# Patient Record
Sex: FEMALE | Race: WHITE | NOT HISPANIC OR LATINO | Employment: OTHER | URBAN - METROPOLITAN AREA
[De-identification: names, ages, dates, MRNs, and addresses within clinical notes are randomized per-mention and may not be internally consistent; named-entity substitution may affect disease eponyms.]

---

## 2017-05-03 RX ORDER — ESCITALOPRAM OXALATE 10 MG/1
TABLET ORAL
Qty: 7 TABLET | OUTPATIENT
Start: 2017-05-03

## 2017-05-03 RX ORDER — QUETIAPINE FUMARATE 50 MG/1
TABLET, FILM COATED ORAL
Qty: 7 TABLET | OUTPATIENT
Start: 2017-05-03

## 2017-05-03 RX ORDER — DONEPEZIL HYDROCHLORIDE 10 MG/1
TABLET, FILM COATED ORAL
Qty: 7 TABLET | OUTPATIENT
Start: 2017-05-03

## 2017-05-03 NOTE — TELEPHONE ENCOUNTER
I have seen her once in January of 2015.  Last time we requested an office visit they were going to see about hospice taking over her care.

## 2017-05-03 NOTE — TELEPHONE ENCOUNTER
Son & daughter in law/ Kris/[place]   They stated she is being taken care of by [facility]. She has not needed to go to the doctor - she has been well.

## 2018-04-06 ENCOUNTER — HOSPITAL ENCOUNTER (EMERGENCY)
Facility: HOSPITAL | Age: 83
Discharge: HOME | End: 2018-04-06
Attending: EMERGENCY MEDICINE
Payer: MEDICARE

## 2018-04-06 VITALS
DIASTOLIC BLOOD PRESSURE: 89 MMHG | BODY MASS INDEX: 22.63 KG/M2 | SYSTOLIC BLOOD PRESSURE: 167 MMHG | HEIGHT: 62 IN | TEMPERATURE: 98 F | HEART RATE: 95 BPM | WEIGHT: 123 LBS | RESPIRATION RATE: 16 BRPM | OXYGEN SATURATION: 96 %

## 2018-04-06 DIAGNOSIS — S43.401A SPRAIN OF RIGHT SHOULDER, UNSPECIFIED SHOULDER SPRAIN TYPE, INITIAL ENCOUNTER: ICD-10-CM
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
## 2018-04-06 DIAGNOSIS — S70.00XA CONTUSION OF HIP, UNSPECIFIED LATERALITY, INITIAL ENCOUNTER: ICD-10-CM

## 2018-04-06 DIAGNOSIS — S00.83XA CONTUSION OF FOREHEAD, INITIAL ENCOUNTER: Primary | ICD-10-CM

## 2018-04-06 LAB
ALBUMIN SERPL BCP-MCNC: 3.4 G/DL
ALP SERPL-CCNC: 114 U/L
ALT SERPL W/O P-5'-P-CCNC: 10 U/L
ANION GAP SERPL CALC-SCNC: 11 MMOL/L
AST SERPL-CCNC: 22 U/L
BASOPHILS # BLD AUTO: 0 K/UL
BASOPHILS NFR BLD: 0.3 %
BILIRUB SERPL-MCNC: 0.7 MG/DL
BUN SERPL-MCNC: 11 MG/DL
CALCIUM SERPL-MCNC: 9.6 MG/DL
CHLORIDE SERPL-SCNC: 106 MMOL/L
CO2 SERPL-SCNC: 26 MMOL/L
CREAT SERPL-MCNC: 1.3 MG/DL
DIFFERENTIAL METHOD: ABNORMAL
EOSINOPHIL # BLD AUTO: 2.6 K/UL
EOSINOPHIL NFR BLD: 18.6 %
ERYTHROCYTE [DISTWIDTH] IN BLOOD BY AUTOMATED COUNT: 18.5 %
EST. GFR  (AFRICAN AMERICAN): 41 ML/MIN/1.73 M^2
EST. GFR  (NON AFRICAN AMERICAN): 35 ML/MIN/1.73 M^2
GLUCOSE SERPL-MCNC: 100 MG/DL
HCT VFR BLD AUTO: 40.2 %
HGB BLD-MCNC: 13 G/DL
INR PPP: 1.1
LYMPHOCYTES # BLD AUTO: 1.6 K/UL
LYMPHOCYTES NFR BLD: 11.2 %
MCH RBC QN AUTO: 27.1 PG
MCHC RBC AUTO-ENTMCNC: 32.3 G/DL
MCV RBC AUTO: 84 FL
MONOCYTES # BLD AUTO: 0.8 K/UL
MONOCYTES NFR BLD: 5.5 %
NEUTROPHILS # BLD AUTO: 8.9 K/UL
NEUTROPHILS NFR BLD: 64.4 %
PLATELET # BLD AUTO: 161 K/UL
PLATELET BLD QL SMEAR: ABNORMAL
PMV BLD AUTO: 11.1 FL
POTASSIUM SERPL-SCNC: 2.8 MMOL/L
PROT SERPL-MCNC: 7.9 G/DL
PROTHROMBIN TIME: 10.7 SEC
RBC # BLD AUTO: 4.79 M/UL
SODIUM SERPL-SCNC: 143 MMOL/L
TROPONIN I SERPL DL<=0.01 NG/ML-MCNC: 0.01 NG/ML
WBC # BLD AUTO: 13.9 K/UL
WBC NRBC COR # BLD: ABNORMAL K/UL

## 2018-04-06 PROCEDURE — 25000003 PHARM REV CODE 250: Performed by: EMERGENCY MEDICINE

## 2018-04-06 PROCEDURE — 85610 PROTHROMBIN TIME: CPT

## 2018-04-06 PROCEDURE — 85025 COMPLETE CBC W/AUTO DIFF WBC: CPT

## 2018-04-06 PROCEDURE — 93005 ELECTROCARDIOGRAM TRACING: CPT

## 2018-04-06 PROCEDURE — 99285 EMERGENCY DEPT VISIT HI MDM: CPT | Mod: 25

## 2018-04-06 PROCEDURE — 84484 ASSAY OF TROPONIN QUANT: CPT

## 2018-04-06 PROCEDURE — 36415 COLL VENOUS BLD VENIPUNCTURE: CPT

## 2018-04-06 PROCEDURE — 80053 COMPREHEN METABOLIC PANEL: CPT

## 2018-04-06 RX ORDER — HYDROCODONE BITARTRATE AND ACETAMINOPHEN 5; 325 MG/1; MG/1
1 TABLET ORAL
Status: COMPLETED | OUTPATIENT
Start: 2018-04-06 | End: 2018-04-06

## 2018-04-06 RX ADMIN — HYDROCODONE BITARTRATE AND ACETAMINOPHEN 1 TABLET: 5; 325 TABLET ORAL at 07:04

## 2018-04-06 NOTE — ED NOTES
Pt resting in bed awake and alert.  Wound to right arm cleaned and dressed.  Pt complains of right shoulder pain with any movement.

## 2018-04-06 NOTE — ED NOTES
"Pt resting in bed awake and alert.  Able to recount what happened this evening when she "stumbled and fell".  Right upper arm swollen and painful.  Admits to feeling somewhat better than earlier.  BP elevated at this time.  "

## 2018-04-06 NOTE — ED NOTES
Report called to [facility] and Ysabel verbalized understanding and stated the patient's daughter is on their way.

## 2018-04-06 NOTE — ED PROVIDER NOTES
Encounter Date: 2018       History     Chief Complaint   Patient presents with    Fall     pt resides at [facility]-fell but doesn'rt remember details; unknnown LOC; hematoma to right side of forehead and right humerus with skin tear to right lower arm     Chief complaint: Fall    History of present illness:Viviane Morales is a 89 y.o. female who presents via ambulance from [facility] after an unwitnessed fall.  She is found to have marked ecchymosis and swelling to her right forehead and shoulder.  She is unaware of the circumstances leading to her fall.  She denies any headache, neck pain chest or back pain.  She is unaware of any loss of consciousness.  She has a history of Alzheimer's dementia          Review of patient's allergies indicates:   Allergen Reactions    Penicillins      Other reaction(s): Rash    Propafenone      Other reaction(s): Muscle pain  Other reaction(s): Eye irritation    Toprol xl  [metoprolol succinate]      Other reaction(s): diarrhea    Verapamil      Other reaction(s): CONSTIPATION    Zyprexa [olanzapine]      oversedated     Past Medical History:   Diagnosis Date    *Atrial fibrillation     Arthritis     Blood transfusion     Cataract     Chronic kidney disease, stage III (moderate) 2013    Deep vein thrombosis     DEMENTIA     Depression     Fever blister     Hyperlipidemia     Hypertension     Joint pain     Memory loss     Osteoporosis     Stroke     TIA     Past Surgical History:   Procedure Laterality Date    APPENDECTOMY       SECTION       SECTION      EYE SURGERY      Bilateral cataracts    TONSILLECTOMY       Family History   Problem Relation Age of Onset    Stroke Father     Heart disease Sister 75     Pacemaker     Social History   Substance Use Topics    Smoking status: Never Smoker    Smokeless tobacco: Never Used    Alcohol use No     Review of Systems   Constitutional: Negative for activity  change, appetite change, chills, fatigue and fever.   Eyes: Negative for visual disturbance.   Respiratory: Negative for apnea, chest tightness and shortness of breath.    Cardiovascular: Negative for chest pain and palpitations.   Gastrointestinal: Negative for abdominal distention and abdominal pain.   Genitourinary: Negative for difficulty urinating.   Musculoskeletal: Negative for back pain and neck pain.   Skin: Negative for pallor and rash.   Neurological: Negative for headaches.   Hematological: Does not bruise/bleed easily.   Psychiatric/Behavioral: Negative for agitation.       Physical Exam     Initial Vitals [04/06/18 0407]   BP Pulse Resp Temp SpO2   (!) 204/88 106 16 98.3 °F (36.8 °C) (!) 84 %      MAP       126.67         Physical Exam    Nursing note and vitals reviewed.  Constitutional: She appears well-developed and well-nourished.   HENT:   Head: Normocephalic.   Large right for head hematoma   Eyes: Conjunctivae and EOM are normal. Pupils are equal, round, and reactive to light.   Neck: Normal range of motion. Neck supple.   Mild midline cervical tenderness   Cardiovascular: Normal rate, regular rhythm and normal heart sounds. Exam reveals no gallop and no friction rub.    No murmur heard.  Pulmonary/Chest: Effort normal and breath sounds normal. No respiratory distress. She has no wheezes. She has no rhonchi. She has no rales.   Abdominal: Soft. She exhibits no distension. There is no tenderness.   Musculoskeletal: Normal range of motion. She exhibits tenderness (bilateral hip tenderness).   Marked right shoulder swelling and ecchymosis with limited range of motion due to pain   Neurological: She is alert and oriented to person, place, and time.   Skin: Skin is warm and dry. No erythema.   6 x 6 cm skin avulsion of the right lateral forearm   Psychiatric: She has a normal mood and affect.         ED Course   Procedures  Labs Reviewed   CBC W/ AUTO DIFFERENTIAL   COMPREHENSIVE METABOLIC PANEL    TROPONIN I   PROTIME-INR     EKG Readings: (Independently Interpreted)   Rhythm: Atrial Flutter. Heart Rate: 96. ST Segments: Non-Specific ST Segment Depression. Axis: Normal. Other Impression: Anteroseptal Q waves consistent with previous anterior MI.          Medical Decision Making:   Clinical Tests:   Lab Tests: Ordered and Reviewed  ED Management:  Viviane Morales is a 89 y.o. female who presents with  a large right forehead hematoma after an unwitnessed fall.  CT of the head fails to demonstrate any intracranial abnormalities.  Cervical spine is imaged with no evidence of cervical fracture.  Chest and abdomen appear uninjured.  She has a large hematoma of the right shoulder.  X-rays demonstrate Flattening of the anterior surface of the humeral head may represent an impaction fracture.  It is unclear whether this represents a true fracture therefore she is treated with a sling and referred to orthopedic surgery.  Hip x-rays independently interpreted by me fail demonstrate any fracture.  She has no medical abnormality to account for her fall.                      Clinical Impression:\   The primary encounter diagnosis was Contusion of forehead, initial encounter. Diagnoses of Fall, Contusion of hip, unspecified laterality, initial encounter, and Sprain of right shoulder, unspecified shoulder sprain type, initial encounter were also pertinent to this visit.                           Virgil Moser III, MD  04/06/18 1953

## 2018-05-25 ENCOUNTER — PES CALL (OUTPATIENT)
Dept: ADMINISTRATIVE | Facility: CLINIC | Age: 83
End: 2018-05-25

## 2018-09-18 ENCOUNTER — PES CALL (OUTPATIENT)
Dept: ADMINISTRATIVE | Facility: CLINIC | Age: 83
End: 2018-09-18

## 2019-10-10 ENCOUNTER — PES CALL (OUTPATIENT)
Dept: ADMINISTRATIVE | Facility: CLINIC | Age: 84
End: 2019-10-10